# Patient Record
Sex: MALE | Race: BLACK OR AFRICAN AMERICAN | NOT HISPANIC OR LATINO | Employment: UNEMPLOYED | URBAN - METROPOLITAN AREA
[De-identification: names, ages, dates, MRNs, and addresses within clinical notes are randomized per-mention and may not be internally consistent; named-entity substitution may affect disease eponyms.]

---

## 2017-08-28 ENCOUNTER — OFFICE VISIT (OUTPATIENT)
Dept: INTERNAL MEDICINE | Facility: CLINIC | Age: 27
End: 2017-08-28
Payer: COMMERCIAL

## 2017-08-28 ENCOUNTER — LAB VISIT (OUTPATIENT)
Dept: LAB | Facility: HOSPITAL | Age: 27
End: 2017-08-28
Attending: FAMILY MEDICINE
Payer: COMMERCIAL

## 2017-08-28 VITALS
TEMPERATURE: 98 F | HEIGHT: 73 IN | BODY MASS INDEX: 34.04 KG/M2 | SYSTOLIC BLOOD PRESSURE: 120 MMHG | HEART RATE: 63 BPM | DIASTOLIC BLOOD PRESSURE: 60 MMHG | WEIGHT: 256.81 LBS | OXYGEN SATURATION: 98 %

## 2017-08-28 DIAGNOSIS — Z11.3 ROUTINE SCREENING FOR STI (SEXUALLY TRANSMITTED INFECTION): ICD-10-CM

## 2017-08-28 DIAGNOSIS — Z00.00 PREVENTATIVE HEALTH CARE: Primary | ICD-10-CM

## 2017-08-28 DIAGNOSIS — Z11.4 SCREENING FOR HIV WITHOUT PRESENCE OF RISK FACTORS: ICD-10-CM

## 2017-08-28 DIAGNOSIS — Z13.220 SCREENING FOR LIPID DISORDERS: ICD-10-CM

## 2017-08-28 DIAGNOSIS — Z00.00 PREVENTATIVE HEALTH CARE: ICD-10-CM

## 2017-08-28 DIAGNOSIS — F33.0 MAJOR DEPRESSIVE DISORDER, RECURRENT, MILD: Chronic | ICD-10-CM

## 2017-08-28 DIAGNOSIS — E66.9 OBESITY, CLASS I, BMI 30-34.9: Chronic | ICD-10-CM

## 2017-08-28 DIAGNOSIS — L73.1 PSEUDOFOLLICULITIS OF THE BEARD: ICD-10-CM

## 2017-08-28 DIAGNOSIS — Z23 NEED FOR DIPHTHERIA-TETANUS-PERTUSSIS (TDAP) VACCINE: ICD-10-CM

## 2017-08-28 DIAGNOSIS — Z13.1 SCREENING FOR DIABETES MELLITUS: ICD-10-CM

## 2017-08-28 LAB
ALBUMIN SERPL BCP-MCNC: 3.6 G/DL
ALP SERPL-CCNC: 87 U/L
ALT SERPL W/O P-5'-P-CCNC: 23 U/L
ANION GAP SERPL CALC-SCNC: 6 MMOL/L
AST SERPL-CCNC: 18 U/L
BILIRUB SERPL-MCNC: 0.2 MG/DL
BUN SERPL-MCNC: 11 MG/DL
CALCIUM SERPL-MCNC: 9.3 MG/DL
CHLORIDE SERPL-SCNC: 104 MMOL/L
CHOLEST/HDLC SERPL: 3.3 {RATIO}
CO2 SERPL-SCNC: 31 MMOL/L
CREAT SERPL-MCNC: 1 MG/DL
EST. GFR  (AFRICAN AMERICAN): >60 ML/MIN/1.73 M^2
EST. GFR  (NON AFRICAN AMERICAN): >60 ML/MIN/1.73 M^2
GLUCOSE SERPL-MCNC: 81 MG/DL
HDL/CHOLESTEROL RATIO: 30.2 %
HDLC SERPL-MCNC: 149 MG/DL
HDLC SERPL-MCNC: 45 MG/DL
LDLC SERPL CALC-MCNC: 90.4 MG/DL
NONHDLC SERPL-MCNC: 104 MG/DL
POTASSIUM SERPL-SCNC: 4.2 MMOL/L
PROT SERPL-MCNC: 7.4 G/DL
SODIUM SERPL-SCNC: 141 MMOL/L
TRIGL SERPL-MCNC: 68 MG/DL

## 2017-08-28 PROCEDURE — 80061 LIPID PANEL: CPT

## 2017-08-28 PROCEDURE — 36415 COLL VENOUS BLD VENIPUNCTURE: CPT | Mod: PO

## 2017-08-28 PROCEDURE — 80053 COMPREHEN METABOLIC PANEL: CPT

## 2017-08-28 PROCEDURE — 90471 IMMUNIZATION ADMIN: CPT | Mod: S$GLB,,, | Performed by: FAMILY MEDICINE

## 2017-08-28 PROCEDURE — 86592 SYPHILIS TEST NON-TREP QUAL: CPT

## 2017-08-28 PROCEDURE — 99385 PREV VISIT NEW AGE 18-39: CPT | Mod: 25,S$GLB,, | Performed by: FAMILY MEDICINE

## 2017-08-28 PROCEDURE — 90715 TDAP VACCINE 7 YRS/> IM: CPT | Mod: S$GLB,,, | Performed by: FAMILY MEDICINE

## 2017-08-28 PROCEDURE — 86703 HIV-1/HIV-2 1 RESULT ANTBDY: CPT

## 2017-08-28 PROCEDURE — 99999 PR PBB SHADOW E&M-NEW PATIENT-LVL III: CPT | Mod: PBBFAC,,, | Performed by: FAMILY MEDICINE

## 2017-08-28 RX ORDER — CLINDAMYCIN PHOSPHATE 10 MG/G
GEL TOPICAL 2 TIMES DAILY
Qty: 60 G | Refills: 1 | Status: SHIPPED | OUTPATIENT
Start: 2017-08-28

## 2017-08-28 NOTE — PROGRESS NOTES
"CHIEF COMPLAINT  Annual Exam      HISTORY OF PRESENT ILLNESS     Problem List Items Addressed This Visit     Preventative health care - Primary    Current Assessment & Plan     PREVENTIVE SERVICES    · HYPERTENSION SCREENING: negative  BP Readings from Last 3 Encounters:   08/28/17 120/60        · OBESITY SCREENING: POSITIVE (appropirate counseling provided)  Wt Readings from Last 3 Encounters:   08/28/17 116.5 kg (256 lb 13.4 oz)     BMI Readings from Last 3 Encounters:   08/28/17 33.89 kg/m²        · DYSLIPIDEMIA SCREENING: ordered  No results found for: CHOL, TRIG, HDL, LDLCALC, NONHDLCHOL    · DIABETES SCREENING: ordered  No results found for: HGBA1C, GLU    · HIV SCREENING: ordered   No results found for: [id number]     · SEXUALLY TRANSMITTED INFECTION SCREENING: ordered    · TOBACCO USE SCREENING: negative   reports that he has never smoked. He has never used smokeless tobacco.    · ALCOHOL MISUSE SCREENING: negative   reports that he drinks alcohol.    · DEPRESSION SCREENING: POSITIVE (appropirate counseling provided)    · DIET: fair    · PHYSICAL ACTIVITY: good    · IMMUNIZATIONS: reported as up to date according to current CDC guidelines except for those ordered today.    · VISION SCREENING: current - not needed at this time    · ASPIRIN: not indicated    · ABDOMINAL AORTIC ANEURYSM SCREENING: not indicated    · COLORECTAL CANCER SCREENING: not indicated            Relevant Orders    C. trachomatis/N. gonorrhoeae by AMP DNA Urine    HIV-1 and HIV-2 antibodies    RPR    Comprehensive metabolic panel    Lipid panel    Pseudofolliculitis of the beard    Current Assessment & Plan     He reports what he describes as "small boils" occurring on his face in the distribution of his beard, occurring after shaving, particularly when shaving against the grain. On exam, he has a few non-inflamed cystic nodules and non-inflamed papules, which he says represent scars from previous "boils." His history and exam are most " consistent with pseudofolliculitis barbae. I provided him educational information on this topic and we discussed hygienic measures he can take to lessen the SEVERITY of his symptoms. Additionally, he is going to start using topical clindamycin and return after a couple of months to permit me to reexamine his face without limitation of his current beard.         Relevant Medications    clindamycin phosphate 1% (CLINDAGEL) 1 % gel    Obesity, Class I, BMI 30-34.9 (Chronic)    Major depressive disorder, recurrent, mild (Chronic)    Current Assessment & Plan     Symptoms are MILD, chronic. No suicidal thoughts. He agreed to return for a follow-up appointment soon to permit additional time to properly evaluate and treat this condition.           Other Visit Diagnoses     Need for diphtheria-tetanus-pertussis (Tdap) vaccine        Relevant Orders    (In Office Administered) Tdap Vaccine (Completed)    Screening for lipid disorders        Relevant Orders    Lipid panel    Screening for diabetes mellitus        Relevant Orders    Comprehensive metabolic panel    Screening for HIV without presence of risk factors        Relevant Orders    HIV-1 and HIV-2 antibodies    Routine screening for STI (sexually transmitted infection)        Relevant Orders    C. trachomatis/N. gonorrhoeae by AMP DNA Urine    HIV-1 and HIV-2 antibodies    RPR          REVIEW OF SYSTEMS  CONSTITUTIONAL: No fever reported.  CARDIOVASCULAR: No angina reported.  PULMONARY: No hemoptysis reported.  PSYCHIATRIC: No mood instability reported.  NEUROLOGIC: No seizures reported.  ENDOCRINE: No polydipsia reported.  GASTROINTESTINAL: No blood in stool reported.  GENITOURINARY: No hematuria reported.  ENT: No acute hearing changes reported.  OPHTHALMOLOGIC: No acute vision changes reported.  HEMATOLOGIC: No bleeding problems reported.  MUSCULOSKELETAL: No recent injuries reported.  DERMATOLOGIC: No rash reported.  REMAINDER OF COMPLETE REVIEW OF  "SYSTEMS is negative or noncontributory to present illness except as noted herein.     PHYSICAL EXAM  Vitals:    08/28/17 0929   BP: 120/60   BP Location: Right arm   Patient Position: Sitting   BP Method: Large (Manual)   Pulse: 63   Temp: 97.6 °F (36.4 °C)   TempSrc: Tympanic   SpO2: 98%   Weight: 116.5 kg (256 lb 13.4 oz)   Height: 6' 1" (1.854 m)     CONSTITUTIONAL: Vital signs (BP, P, T, RR, et al) noted. No apparent distress. Does not appear acutely ill or septic. Appears adequately hydrated.  HEENT: External ENT grossly unremarkable. Hearing grossly intact. Oropharynx moist.  NECK: Neck supple without meningismus. Trachea midline.  PULM: Lungs clear. Breathing unlabored.  HEART: Auscultation reveals regular rate and rhythm without murmur, gallop or rub. No carotid bruit.  GI: Abdomen soft and nontender. Bowel sounds present.  DERM: Skin warm and moist with normal turgor.   NEURO: Strength is reasonably symmetric without gross focal motor deficits or gross deficits of cranial nerve III-XII.  PSYCH: Alert and oriented x 3. Mood is grossly neutral. Affect appropriate. Judgment and insight not grossly compromised.  MSK: Grossly normal stance and gait.     PAST MEDICAL HISTORY, FAMILY HISTORY, SOCIAL HISTORY, CURRENT MEDICATION LIST, and ALLERGY LIST reviewed by me (MITZI Hooks MD) and are updated consistent with the patient's report.    ASSESSMENT and PLAN  Preventative health care  -     C. trachomatis/N. gonorrhoeae by AMP DNA Urine; Future; Expected date: 08/28/2017  -     HIV-1 and HIV-2 antibodies; Future; Expected date: 08/28/2017  -     RPR; Future; Expected date: 08/28/2017  -     Comprehensive metabolic panel; Future; Expected date: 08/28/2017  -     Lipid panel; Future; Expected date: 08/28/2017    Need for diphtheria-tetanus-pertussis (Tdap) vaccine  -     (In Office Administered) Tdap Vaccine    Screening for lipid disorders  -     Lipid panel; Future; Expected date: " "08/28/2017    Screening for diabetes mellitus  -     Comprehensive metabolic panel; Future; Expected date: 08/28/2017    Screening for HIV without presence of risk factors  -     HIV-1 and HIV-2 antibodies; Future; Expected date: 08/28/2017    Routine screening for STI (sexually transmitted infection)  -     C. trachomatis/N. gonorrhoeae by AMP DNA Urine; Future; Expected date: 08/28/2017  -     HIV-1 and HIV-2 antibodies; Future; Expected date: 08/28/2017  -     RPR; Future; Expected date: 08/28/2017    Pseudofolliculitis of the beard  -     clindamycin phosphate 1% (CLINDAGEL) 1 % gel; Apply topically 2 (two) times daily. -- for 1 month  Dispense: 60 g; Refill: 1    Obesity, Class I, BMI 30-34.9    Major depressive disorder, recurrent, mild        Medication List with Changes/Refills   New Medications    CLINDAMYCIN PHOSPHATE 1% (CLINDAGEL) 1 % GEL    Apply topically 2 (two) times daily. -- for 1 month       Return in about 2 weeks (around 9/11/2017) for review test results and discuss treatment plan, re-evaluate problem(s) discussed today.    ABOUT THIS DOCUMENTATION:  · The order of the conditions listed in the HPI is one of convenience and does not necessarily reflect the chronology of the appointment, nor the relative importance of a condition. It is possible that additional description or status details about condition(s) may be found elsewhere in the documentation for today's encounter.  · Documentation entered by me for this encounter was done in part using speech-recognition technology. Although I have made an effort to ensure accuracy, malapropisms may exist and should be interpreted in context.                        -MITZI Hooks MD    Patient Instructions       · Try using electric razor or "Bump Fighter" razor.    Prevention Guidelines, Men Ages 18 to 39  Screening tests and vaccines are an important part of managing your health. Health counseling is essential, too. Below are guidelines for " these, for men ages 18 to 39. Talk with your healthcare provider to make sure youre up-to-date on what you need.  Screening Who needs it How often   Alcohol misuse All men in this age group At routine exams   Blood pressure All men in this age group Every 2 years if your blood pressure is less than 120/80 mm Hg; yearly if your systolic blood pressure is 120 to 139 mm Hg, or your diastolic blood pressure reading is 80 to 89 mm Hg   Depression All men in this age group At routine exams   Diabetes mellitus, type 2 Adults who have no symptoms but are overweight or obese and have 1 or more other risk factors for diabetes At least every 3 years   Hepatitis C If at increased risk At routine exams   High cholesterol or triglycerides All men ages 35 and older, and younger men at high risk for coronary artery disease At least every 5 years   HIV All men At routine exams   Obesity All men in this age group At routine exams   Syphilis Men at increased risk for infection - talk with your healthcare provider At routine exams   Tuberculosis Men at increased risk for infection - talk with your healthcare provider Check with your healthcare provider   Vision All men in this age group Every 5 to 10 years if no risk factors for eye disease   Vaccines1 Who needs it How often   Chickenpox (varicella) All men in this age group who have no record of this infection or vaccine 2 doses; the second dose should be given at least 4 weeks after the first dose   Hepatitis A Men at increased risk for infection - talk with your healthcare provider 2 doses given at least 6 months apart   Hepatitis B Men at increased risk for infection - talk with your healthcare provider 3 doses over 6 months; second dose should be given 1 month after the first dose; the third dose should be given at least 2 months after the second dose and at least 4 months after the first dose   Haemophilus influenzae Type B (HIB) Men at increased risk for infection - talk with  your healthcare provider 1 to 3 doses   Human papillomavirus (HPV4) All men through age 21 years  Men ages 22 to 26 who are at risk 3 doses; the second dose should be given 1 to 2 months after the first dose and the third dose given 6 months after the first dose   Influenza (flu) All men in this age group Once a year   Measles, mumps, rubella (MMR) All men in this age group who have no record of these infections or vaccines 1 or 2 doses through age 55   Meningococcal Men at increased risk for infection - talk with your healthcare provider 1 or more doses   Pneumococca (PCV13) and Pneumococcal (PPSV23) Men at increased risk for infection - talk with your healthcare provider PCV13: 1 dose ages 19 to 65 (protects against 13 types of pneumococcal bacteria)  PPSV23: 1 to 2 doses through age 64, or 1 dose at 65 or older (protects against 23 types of pneumococcal bacteria)   Tetanus/diphtheria/pertussis (Td/Tdap) booster All men in this age group A one-time Tdap booster after age 18, then Td every10 years   Counseling Who needs it How often   Diet and exercise Overweight or obese people When diagnosed, and then at routine exams   Use of tobacco and the health affects it can cause All men in this age group Every visit   Sexually transmitted infection prevention Men who are sexually active At routine exams   Skin cancer Prevention of skin cancer in fair-skinned adults through age 24 At routine exams   1Those who are 18 years of age, who are not up-to-date on their childhood immunizations, should receive all appropriate catch-up vaccines recommended by the CDC.  Date Last Reviewed: 3/30/2015  © 8985-7877 [employer], [facility]. [address] [place] [address], [place] [address]. All rights reserved. This information is not intended as a substitute for professional medical care. Always follow your healthcare professional's instructions.        What Can Cause Depression?  Certain factors have been known to trigger depression. Below  are some common known causes. Any of these factors, or a combination of them, can make depression more likely. Sometimes, depression occurs for no one clear reason. But no matter what the cause, depression can be treated.    Loss or stress  Normal grief over a death, breakup, or other loss may become depression. Life stresses such as physical abuse, job loss, or sudden change in finances can also trigger depression. In some cases, years go by before the depression sets in.  Family history  The tendency to develop depression seems to run in families. If one or more of your close relatives (parents, grandparents, or siblings) have had an episode of depression, you may be more likely to develop the illness, too.  Drugs or alcohol  Drugs and alcohol can upset the chemical balance in the brain. This can lead to an episode of depression. Some depressed people turn to drugs or alcohol to numb the pain. But in the long run, doing so just makes depression worse.  Medications  Depression can be a side effect of some medications for high blood pressure, cancer, pain, and other health problems. So tell your doctor about all medications you take. But never stop taking one without your doctors okay.  Physical illness  Being sick can make anyone feel frustrated and sad. But some health problems may cause actual changes in your brain that lead to depression. Other health problems, such as an underactive thyroid, may be mistaken for depression.  Hormones  Hormones carry messages in the bloodstream. They may affect brain chemicals, leading to depression. Women may get depressed when their hormone levels change quickly, such as just before their period, after giving birth, or during menopause.  Date Last Reviewed: 1/19/2015 © 2000-2016 [employer]. [address] [place] [address], [place] [address]. All rights reserved. This information is not intended as a substitute for professional medical care. Always follow your healthcare  professional's instructions.        Depression: Tips to Help Yourself  As your health care providers help treat your depression, you can also help yourself. Keep in mind that your illness affects you emotionally, physically, mentally, and socially. So full recovery will take time. Take care of your body and your soul, and be patient with yourself as you get better.    Be with others  Dont isolate yourself--youll only feel worse. Try to be with other people. And take part in fun activities when you can. Go to a movie, [employer], Confucianist service, or social event. Talk openly with people you can trust. And accept help when its offered.  Keep your perspective  · Depression can cloud your judgment. So wait until you feel better before making major life decisions, such as changing jobs, moving, or getting  or .  · This illness is not your fault. Dont blame yourself for your depression.  · Recovering from depression is a process. Dont be discouraged if it takes some time to feel better.  · Depression saps your energy and concentration. So you wont be able to do all the things you used to do. Set small goals and do what you can.  Take care of your body  People with depression often lose the desire to take care of themselves. That only makes their problems worse. During treatment and afterward, make a point to:  · Exercise. Its a great way to take care of your body. And studies have shown that exercise helps fight depression.  · Avoid drugs and alcohol. These may ease the pain in the short term. But theyll only make your problems worse in the long run.  · Get relief from stress. Ask your healthcare provider for relaxation exercises and techniques to help relieve stress.  · Eat right. A balanced and healthy diet helps keep your body healthy.  Date Last Reviewed: 1/19/2015  © 3746-8353 [employer]. [address] [place] [address], [place] [address]. All rights reserved. This information is not  intended as a substitute for professional medical care. Always follow your healthcare professional's instructions.

## 2017-08-28 NOTE — ASSESSMENT & PLAN NOTE
PREVENTIVE SERVICES    · HYPERTENSION SCREENING: negative  BP Readings from Last 3 Encounters:   08/28/17 120/60        · OBESITY SCREENING: POSITIVE (appropirate counseling provided)  Wt Readings from Last 3 Encounters:   08/28/17 116.5 kg (256 lb 13.4 oz)     BMI Readings from Last 3 Encounters:   08/28/17 33.89 kg/m²        · DYSLIPIDEMIA SCREENING: ordered  No results found for: CHOL, TRIG, HDL, LDLCALC, NONHDLCHOL    · DIABETES SCREENING: ordered  No results found for: HGBA1C, GLU    · HIV SCREENING: ordered   No results found for: [id number]     · SEXUALLY TRANSMITTED INFECTION SCREENING: ordered    · TOBACCO USE SCREENING: negative   reports that he has never smoked. He has never used smokeless tobacco.    · ALCOHOL MISUSE SCREENING: negative   reports that he drinks alcohol.    · DEPRESSION SCREENING: POSITIVE (appropirate counseling provided)    · DIET: fair    · PHYSICAL ACTIVITY: good    · IMMUNIZATIONS: reported as up to date according to current CDC guidelines except for those ordered today.    · VISION SCREENING: current - not needed at this time    · ASPIRIN: not indicated    · ABDOMINAL AORTIC ANEURYSM SCREENING: not indicated    · COLORECTAL CANCER SCREENING: not indicated

## 2017-08-28 NOTE — PATIENT INSTRUCTIONS
"  · Try using electric razor or "Bump Fighter" razor.    Prevention Guidelines, Men Ages 18 to 39  Screening tests and vaccines are an important part of managing your health. Health counseling is essential, too. Below are guidelines for these, for men ages 18 to 39. Talk with your healthcare provider to make sure youre up-to-date on what you need.  Screening Who needs it How often   Alcohol misuse All men in this age group At routine exams   Blood pressure All men in this age group Every 2 years if your blood pressure is less than 120/80 mm Hg; yearly if your systolic blood pressure is 120 to 139 mm Hg, or your diastolic blood pressure reading is 80 to 89 mm Hg   Depression All men in this age group At routine exams   Diabetes mellitus, type 2 Adults who have no symptoms but are overweight or obese and have 1 or more other risk factors for diabetes At least every 3 years   Hepatitis C If at increased risk At routine exams   High cholesterol or triglycerides All men ages 35 and older, and younger men at high risk for coronary artery disease At least every 5 years   HIV All men At routine exams   Obesity All men in this age group At routine exams   Syphilis Men at increased risk for infection - talk with your healthcare provider At routine exams   Tuberculosis Men at increased risk for infection - talk with your healthcare provider Check with your healthcare provider   Vision All men in this age group Every 5 to 10 years if no risk factors for eye disease   Vaccines1 Who needs it How often   Chickenpox (varicella) All men in this age group who have no record of this infection or vaccine 2 doses; the second dose should be given at least 4 weeks after the first dose   Hepatitis A Men at increased risk for infection - talk with your healthcare provider 2 doses given at least 6 months apart   Hepatitis B Men at increased risk for infection - talk with your healthcare provider 3 doses over 6 months; second dose should be " given 1 month after the first dose; the third dose should be given at least 2 months after the second dose and at least 4 months after the first dose   Haemophilus influenzae Type B (HIB) Men at increased risk for infection - talk with your healthcare provider 1 to 3 doses   Human papillomavirus (HPV4) All men through age 21 years  Men ages 22 to 26 who are at risk 3 doses; the second dose should be given 1 to 2 months after the first dose and the third dose given 6 months after the first dose   Influenza (flu) All men in this age group Once a year   Measles, mumps, rubella (MMR) All men in this age group who have no record of these infections or vaccines 1 or 2 doses through age 55   Meningococcal Men at increased risk for infection - talk with your healthcare provider 1 or more doses   Pneumococca (PCV13) and Pneumococcal (PPSV23) Men at increased risk for infection - talk with your healthcare provider PCV13: 1 dose ages 19 to 65 (protects against 13 types of pneumococcal bacteria)  PPSV23: 1 to 2 doses through age 64, or 1 dose at 65 or older (protects against 23 types of pneumococcal bacteria)   Tetanus/diphtheria/pertussis (Td/Tdap) booster All men in this age group A one-time Tdap booster after age 18, then Td every10 years   Counseling Who needs it How often   Diet and exercise Overweight or obese people When diagnosed, and then at routine exams   Use of tobacco and the health affects it can cause All men in this age group Every visit   Sexually transmitted infection prevention Men who are sexually active At routine exams   Skin cancer Prevention of skin cancer in fair-skinned adults through age 24 At routine exams   1Those who are 18 years of age, who are not up-to-date on their childhood immunizations, should receive all appropriate catch-up vaccines recommended by the CDC.  Date Last Reviewed: 3/30/2015  © 1872-3778 [employer]. [address] [place] [address], [place] [address]. All rights reserved.  This information is not intended as a substitute for professional medical care. Always follow your healthcare professional's instructions.        What Can Cause Depression?  Certain factors have been known to trigger depression. Below are some common known causes. Any of these factors, or a combination of them, can make depression more likely. Sometimes, depression occurs for no one clear reason. But no matter what the cause, depression can be treated.    Loss or stress  Normal grief over a death, breakup, or other loss may become depression. Life stresses such as physical abuse, job loss, or sudden change in finances can also trigger depression. In some cases, years go by before the depression sets in.  Family history  The tendency to develop depression seems to run in families. If one or more of your close relatives (parents, grandparents, or siblings) have had an episode of depression, you may be more likely to develop the illness, too.  Drugs or alcohol  Drugs and alcohol can upset the chemical balance in the brain. This can lead to an episode of depression. Some depressed people turn to drugs or alcohol to numb the pain. But in the long run, doing so just makes depression worse.  Medications  Depression can be a side effect of some medications for high blood pressure, cancer, pain, and other health problems. So tell your doctor about all medications you take. But never stop taking one without your doctors okay.  Physical illness  Being sick can make anyone feel frustrated and sad. But some health problems may cause actual changes in your brain that lead to depression. Other health problems, such as an underactive thyroid, may be mistaken for depression.  Hormones  Hormones carry messages in the bloodstream. They may affect brain chemicals, leading to depression. Women may get depressed when their hormone levels change quickly, such as just before their period, after giving birth, or during menopause.  Date  Last Reviewed: 1/19/2015  © 6845-8136 [employer]. [address] [place] [address], [place] [address]. All rights reserved. This information is not intended as a substitute for professional medical care. Always follow your healthcare professional's instructions.        Depression: Tips to Help Yourself  As your health care providers help treat your depression, you can also help yourself. Keep in mind that your illness affects you emotionally, physically, mentally, and socially. So full recovery will take time. Take care of your body and your soul, and be patient with yourself as you get better.    Be with others  Dont isolate yourself--youll only feel worse. Try to be with other people. And take part in fun activities when you can. Go to a movie, [employer], [employer] service, or social event. Talk openly with people you can trust. And accept help when its offered.  Keep your perspective  · Depression can cloud your judgment. So wait until you feel better before making major life decisions, such as changing jobs, moving, or getting  or .  · This illness is not your fault. Dont blame yourself for your depression.  · Recovering from depression is a process. Dont be discouraged if it takes some time to feel better.  · Depression saps your energy and concentration. So you wont be able to do all the things you used to do. Set small goals and do what you can.  Take care of your body  People with depression often lose the desire to take care of themselves. That only makes their problems worse. During treatment and afterward, make a point to:  · Exercise. Its a great way to take care of your body. And studies have shown that exercise helps fight depression.  · Avoid drugs and alcohol. These may ease the pain in the short term. But theyll only make your problems worse in the long run.  · Get relief from stress. Ask your healthcare provider for relaxation exercises and techniques to help relieve  stress.  · Eat right. A balanced and healthy diet helps keep your body healthy.  Date Last Reviewed: 1/19/2015  © 8558-7020 [employer]. [address] [place] [address], [place] [address]. All rights reserved. This information is not intended as a substitute for professional medical care. Always follow your healthcare professional's instructions.

## 2017-08-28 NOTE — ASSESSMENT & PLAN NOTE
Symptoms are MILD, chronic. No suicidal thoughts. He agreed to return for a follow-up appointment soon to permit additional time to properly evaluate and treat this condition.

## 2017-08-28 NOTE — ASSESSMENT & PLAN NOTE
"He reports what he describes as "small boils" occurring on his face in the distribution of his beard, occurring after shaving, particularly when shaving against the grain. 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 I provided him educational information on this topic and we discussed hygienic measures he can take to lessen the SEVERITY of his symptoms. Additionally, he is going to start using topical clindamycin and return after a couple of months to permit me to reexamine his face without limitation of his current beard.  "

## 2017-08-29 LAB
HIV 1+2 AB+HIV1 P24 AG SERPL QL IA: NEGATIVE
RPR SER QL: NORMAL

## 2017-09-05 NOTE — PROGRESS NOTES
SEE PATIENT PORTAL COMMENT  (This is FYI only. No further action required, unless you need to notify the patient or otherwise deem it necessary.)  --------------------------------------------------------------------------------   Ty's future appointment include:  No future appointments.

## 2017-11-27 PROBLEM — Z00.00 PREVENTATIVE HEALTH CARE: Status: RESOLVED | Noted: 2017-08-28 | Resolved: 2017-11-27
